# Patient Record
Sex: FEMALE | URBAN - METROPOLITAN AREA
[De-identification: names, ages, dates, MRNs, and addresses within clinical notes are randomized per-mention and may not be internally consistent; named-entity substitution may affect disease eponyms.]

---

## 2022-07-09 ENCOUNTER — TELEPHONE ENCOUNTER (OUTPATIENT)
Dept: URBAN - METROPOLITAN AREA CLINIC 121 | Facility: CLINIC | Age: 37
End: 2022-07-09

## 2022-07-10 ENCOUNTER — TELEPHONE ENCOUNTER (OUTPATIENT)
Dept: URBAN - METROPOLITAN AREA CLINIC 121 | Facility: CLINIC | Age: 37
End: 2022-07-10

## 2023-01-29 ENCOUNTER — RX ONLY (RX ONLY)
Age: 38
End: 2023-01-29

## 2025-01-09 ENCOUNTER — RX ONLY (RX ONLY)
Age: 40
End: 2025-01-09

## 2025-01-30 ENCOUNTER — APPOINTMENT (OUTPATIENT)
Dept: URBAN - METROPOLITAN AREA CLINIC 119 | Facility: CLINIC | Age: 40
Setting detail: DERMATOLOGY
End: 2025-01-30

## 2025-01-30 DIAGNOSIS — L259 CONTACT DERMATITIS AND OTHER ECZEMA, UNSPECIFIED CAUSE: ICD-10-CM | Status: INADEQUATELY CONTROLLED

## 2025-01-30 DIAGNOSIS — L74.51 PRIMARY FOCAL HYPERHIDROSIS: ICD-10-CM | Status: INADEQUATELY CONTROLLED

## 2025-01-30 PROBLEM — L23.9 ALLERGIC CONTACT DERMATITIS, UNSPECIFIED CAUSE: Status: ACTIVE | Noted: 2025-01-30

## 2025-01-30 PROBLEM — L74.519 PRIMARY FOCAL HYPERHIDROSIS, UNSPECIFIED: Status: ACTIVE | Noted: 2025-01-30

## 2025-01-30 PROCEDURE — ? REFERRAL

## 2025-01-30 PROCEDURE — 99204 OFFICE O/P NEW MOD 45 MIN: CPT

## 2025-01-30 PROCEDURE — ? COUNSELING

## 2025-01-30 PROCEDURE — ? PRESCRIPTION

## 2025-01-30 RX ORDER — GLYCOPYRROLATE 1 MG/1
TABLET ORAL BID
Qty: 180 | Refills: 1 | Status: ERX | COMMUNITY
Start: 2025-01-30

## 2025-01-30 RX ORDER — TRIAMCINOLONE ACETONIDE 1 MG/G
CREAM TOPICAL
Qty: 80 | Refills: 0 | Status: ERX | COMMUNITY
Start: 2025-01-30

## 2025-01-30 RX ADMIN — GLYCOPYRROLATE: 1 TABLET ORAL at 00:00

## 2025-01-30 RX ADMIN — TRIAMCINOLONE ACETONIDE: 1 CREAM TOPICAL at 00:00

## 2025-01-30 NOTE — PROCEDURE: COUNSELING
Patient Specific Counseling (Will Not Stick From Patient To Patient): 1) Triamcinolone 0.1% cream to flares on the body that are rough, red or itchy 2 times per day for 5 days on then 2 days off as needed. Stop when clear.  \\n\\n2) Apply Mupirocin (antibiotic) ointment 2 times per day to any open, red or crusted areas until healed. Avoid triple antibiotic ointment over the counter, as this is another common cause of allergic contact dermatitis.  \\n\\n3) Take oral Fexofenadine 180 mg in morning and 180 mg in afternoon as needed to decrease itch. This is OVER THE COUNTER and is non-sedating for most patients.  \\n\\n4) Use only fragrance-free products, including: - Fragrance-free soap/ cleanser such as Dove UNSCENTED for sensitive skin Bar soap (not liquid) - Fragrance-free detergent such as All Free & Clear or Tide FREE - Fragrance free fabric softener such as none or Bounce FREE - Fragrance-free dryer sheets such as none or Bounce FREE - Fragrance-free moisturizing creams such as CeraVe Healing ointment. - Avoid all moist wipes, as even some of the fragrance-free wipes contain a preservative to which increasing numbers of patients are allergic. If you must use wipes, then \"Water Wipes\" are the most hypoallergenic. - Avoid perfumes  - Fragrance-free deodorant such as Vanicream Clinical Strength deodorant (order on amazon.com) or Dove sensitive skin unscented deodorant \\n\\n5) Follow up with Allergy (Dr. Tona Gayle) if you would like to pursue comprehensive patch testing.  Please bring the op notes with you so she can see what was used.  It's possible that Betadine tape could have caused a contact dermatitis.\\n\\nFollow up in 6 weeks.
Detail Level: Detailed
Medical Necessity Clause: Botulinum toxin hyperhidrosis therapy is medically necessary because
Medical Necessity Information: [employer] Guidelines vary from payer to payer. Please check with your payer's policy to determine medical necessity.

## 2025-01-30 NOTE — HPI: RASH
What Type Of Note Output Would You Prefer (Optional)?: Bullet Format
Is The Patient Presenting As Previously Scheduled?: Yes
How Severe Is Your Rash?: mild
Is This A New Presentation, Or A Follow-Up?: Rash
Additional History: Patient states that she has a surgery on her shoulder and was seen on [facility] and was prescribed Allegra 180 mg twice daily, triamcinolone 0.1% ointment, and mupurocin and gentle cleanser.